# Patient Record
Sex: MALE | Race: WHITE | NOT HISPANIC OR LATINO | URBAN - METROPOLITAN AREA
[De-identification: names, ages, dates, MRNs, and addresses within clinical notes are randomized per-mention and may not be internally consistent; named-entity substitution may affect disease eponyms.]

---

## 2017-01-08 ENCOUNTER — EMERGENCY (EMERGENCY)
Facility: HOSPITAL | Age: 27
LOS: 1 days | Discharge: HOME | End: 2017-01-08
Admitting: EMERGENCY MEDICINE
Payer: MEDICAID

## 2017-01-08 PROCEDURE — 12011 RPR F/E/E/N/L/M 2.5 CM/<: CPT

## 2017-01-08 PROCEDURE — 85027 COMPLETE CBC AUTOMATED: CPT

## 2017-01-08 PROCEDURE — 99282 EMERGENCY DEPT VISIT SF MDM: CPT | Mod: 25

## 2017-01-08 PROCEDURE — 99283 EMERGENCY DEPT VISIT LOW MDM: CPT | Mod: 25

## 2017-04-16 ENCOUNTER — EMERGENCY (EMERGENCY)
Facility: HOSPITAL | Age: 27
LOS: 1 days | End: 2017-04-16
Attending: EMERGENCY MEDICINE | Admitting: EMERGENCY MEDICINE
Payer: SELF-PAY

## 2017-04-16 VITALS
HEART RATE: 71 BPM | WEIGHT: 169.98 LBS | HEIGHT: 72 IN | OXYGEN SATURATION: 97 % | TEMPERATURE: 98 F | SYSTOLIC BLOOD PRESSURE: 123 MMHG | RESPIRATION RATE: 16 BRPM | DIASTOLIC BLOOD PRESSURE: 66 MMHG

## 2017-04-16 VITALS
HEART RATE: 77 BPM | SYSTOLIC BLOOD PRESSURE: 114 MMHG | RESPIRATION RATE: 14 BRPM | TEMPERATURE: 98 F | DIASTOLIC BLOOD PRESSURE: 72 MMHG | OXYGEN SATURATION: 96 %

## 2017-04-16 PROCEDURE — 99284 EMERGENCY DEPT VISIT MOD MDM: CPT | Mod: 25

## 2017-04-16 PROCEDURE — 99283 EMERGENCY DEPT VISIT LOW MDM: CPT

## 2017-04-16 PROCEDURE — 71101 X-RAY EXAM UNILAT RIBS/CHEST: CPT | Mod: 26,LT

## 2017-04-16 PROCEDURE — 71045 X-RAY EXAM CHEST 1 VIEW: CPT

## 2017-04-16 PROCEDURE — 71100 X-RAY EXAM RIBS UNI 2 VIEWS: CPT

## 2017-04-16 RX ORDER — IBUPROFEN 200 MG
600 TABLET ORAL ONCE
Qty: 0 | Refills: 0 | Status: COMPLETED | OUTPATIENT
Start: 2017-04-16 | End: 2017-04-16

## 2017-04-16 RX ADMIN — Medication 600 MILLIGRAM(S): at 15:14

## 2017-04-16 RX ADMIN — Medication 600 MILLIGRAM(S): at 14:33

## 2017-04-16 NOTE — ED PROVIDER NOTE - OBJECTIVE STATEMENT
28 y/o M pt presents to the ED c/o left sided rib pain, onset Friday, worsening today. Pt reports he collided with another player while playing soccer. Also c/o abdominal pain. Denies LOC, headache, head trauma or back pain. No other complaints at this time.

## 2017-04-16 NOTE — ED ADULT NURSE NOTE - OBJECTIVE STATEMENT
collided with another played during soccer hit to right rib cage pains with movement ambulation deep breathing

## 2017-04-16 NOTE — ED PROVIDER NOTE - NS ED MD SCRIBE ATTENDING SCRIBE SECTIONS
PAST MEDICAL/SURGICAL/SOCIAL HISTORY/VITAL SIGNS( Pullset)/HISTORY OF PRESENT ILLNESS/HIV/PHYSICAL EXAM/REVIEW OF SYSTEMS/DISPOSITION

## 2017-04-16 NOTE — ED ADULT NURSE REASSESSMENT NOTE - NS ED NURSE REASSESS COMMENT FT1
returned from xray and still c/o rib pains but still has pains unrelieved with meds thus far dr de la o aware
pt re evaluated by md and to be d'c/d  pt discharged stable and ambulatory in nad at present d/c instruction reinforced and pt and friend verbalized understanding vital signs as charted

## 2017-04-16 NOTE — ED PROVIDER NOTE - SKIN, MLM
Skin normal color for race, warm, dry and intact. No evidence of rash. Scar on left side of back from mole removal.

## 2017-04-16 NOTE — ED ADULT NURSE NOTE - CHPI ED SYMPTOMS NEG
no deformity/no fever/no stiffness/no difficulty bearing weight/no numbness/no abrasion/no weakness/no bruising/no tingling